# Patient Record
Sex: MALE | Race: AMERICAN INDIAN OR ALASKA NATIVE | NOT HISPANIC OR LATINO
[De-identification: names, ages, dates, MRNs, and addresses within clinical notes are randomized per-mention and may not be internally consistent; named-entity substitution may affect disease eponyms.]

---

## 2020-03-17 ENCOUNTER — TRANSCRIPTION ENCOUNTER (OUTPATIENT)
Age: 30
End: 2020-03-17

## 2020-03-19 ENCOUNTER — TRANSCRIPTION ENCOUNTER (OUTPATIENT)
Age: 30
End: 2020-03-19

## 2020-03-26 ENCOUNTER — TRANSCRIPTION ENCOUNTER (OUTPATIENT)
Age: 30
End: 2020-03-26

## 2020-04-02 ENCOUNTER — TRANSCRIPTION ENCOUNTER (OUTPATIENT)
Age: 30
End: 2020-04-02

## 2020-04-23 ENCOUNTER — TRANSCRIPTION ENCOUNTER (OUTPATIENT)
Age: 30
End: 2020-04-23

## 2021-04-16 ENCOUNTER — OUTPATIENT (OUTPATIENT)
Dept: OUTPATIENT SERVICES | Facility: HOSPITAL | Age: 31
LOS: 1 days | Discharge: STILL ADMITTED OR ADMINISTRATIVE | End: 2021-04-16
Payer: MEDICAID

## 2021-04-16 PROCEDURE — 90839 PSYTX CRISIS INITIAL 60 MIN: CPT | Mod: 95

## 2021-04-19 DIAGNOSIS — F33.2 MAJOR DEPRESSIVE DISORDER, RECURRENT SEVERE WITHOUT PSYCHOTIC FEATURES: ICD-10-CM

## 2021-07-02 ENCOUNTER — OUTPATIENT (OUTPATIENT)
Dept: OUTPATIENT SERVICES | Facility: HOSPITAL | Age: 31
LOS: 1 days | Discharge: STILL ADMITTED OR ADMINISTRATIVE | End: 2021-07-02
Payer: MEDICAID

## 2021-07-02 PROCEDURE — 99214 OFFICE O/P EST MOD 30 MIN: CPT

## 2021-07-06 DIAGNOSIS — F60.3 BORDERLINE PERSONALITY DISORDER: ICD-10-CM

## 2021-07-06 DIAGNOSIS — F41.1 GENERALIZED ANXIETY DISORDER: ICD-10-CM

## 2021-07-06 DIAGNOSIS — F33.2 MAJOR DEPRESSIVE DISORDER, RECURRENT SEVERE WITHOUT PSYCHOTIC FEATURES: ICD-10-CM

## 2021-12-08 ENCOUNTER — NON-APPOINTMENT (OUTPATIENT)
Age: 31
End: 2021-12-08

## 2021-12-09 ENCOUNTER — OUTPATIENT (OUTPATIENT)
Dept: OUTPATIENT SERVICES | Facility: HOSPITAL | Age: 31
LOS: 1 days | Discharge: HOME | End: 2021-12-09
Payer: MEDICAID

## 2021-12-09 ENCOUNTER — TRANSCRIPTION ENCOUNTER (OUTPATIENT)
Age: 31
End: 2021-12-09

## 2021-12-09 ENCOUNTER — APPOINTMENT (OUTPATIENT)
Dept: WOUND CARE | Facility: HOSPITAL | Age: 31
End: 2021-12-09
Payer: MEDICAID

## 2021-12-09 VITALS
DIASTOLIC BLOOD PRESSURE: 79 MMHG | WEIGHT: 155 LBS | HEIGHT: 67 IN | TEMPERATURE: 97 F | HEART RATE: 61 BPM | SYSTOLIC BLOOD PRESSURE: 147 MMHG | RESPIRATION RATE: 18 BRPM | BODY MASS INDEX: 24.33 KG/M2

## 2021-12-09 VITALS
HEART RATE: 61 BPM | DIASTOLIC BLOOD PRESSURE: 79 MMHG | HEIGHT: 67 IN | OXYGEN SATURATION: 97 % | RESPIRATION RATE: 18 BRPM | WEIGHT: 155 LBS | TEMPERATURE: 97 F | BODY MASS INDEX: 24.33 KG/M2 | SYSTOLIC BLOOD PRESSURE: 147 MMHG

## 2021-12-09 DIAGNOSIS — Z83.3 FAMILY HISTORY OF DIABETES MELLITUS: ICD-10-CM

## 2021-12-09 DIAGNOSIS — Z82.49 FAMILY HISTORY OF ISCHEMIC HEART DISEASE AND OTHER DISEASES OF THE CIRCULATORY SYSTEM: ICD-10-CM

## 2021-12-09 DIAGNOSIS — Z82.5 FAMILY HISTORY OF ASTHMA AND OTHER CHRONIC LOWER RESPIRATORY DISEASES: ICD-10-CM

## 2021-12-09 DIAGNOSIS — T22.332A: ICD-10-CM

## 2021-12-09 DIAGNOSIS — S41.009A UNSPECIFIED OPEN WOUND OF UNSPECIFIED SHOULDER, INITIAL ENCOUNTER: ICD-10-CM

## 2021-12-09 DIAGNOSIS — Z86.59 PERSONAL HISTORY OF OTHER MENTAL AND BEHAVIORAL DISORDERS: ICD-10-CM

## 2021-12-09 DIAGNOSIS — Z78.9 OTHER SPECIFIED HEALTH STATUS: ICD-10-CM

## 2021-12-09 PROBLEM — Z00.00 ENCOUNTER FOR PREVENTIVE HEALTH EXAMINATION: Status: ACTIVE | Noted: 2021-12-09

## 2021-12-09 PROCEDURE — 99203 OFFICE O/P NEW LOW 30 MIN: CPT

## 2021-12-09 PROCEDURE — G0463: CPT

## 2021-12-09 RX ORDER — ACETAMINOPHEN 500 MG/1
500 TABLET, COATED ORAL DAILY
Refills: 0 | Status: ACTIVE | COMMUNITY

## 2021-12-09 RX ORDER — DULOXETINE HYDROCHLORIDE 30 MG/1
30 CAPSULE, DELAYED RELEASE ORAL DAILY
Refills: 0 | Status: ACTIVE | COMMUNITY
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
## 2021-12-10 NOTE — ASSESSMENT
[Verbal] : Verbal [Demo] : Demo [Patient] : Patient [Good - alert, interested, motivated] : Good - alert, interested, motivated [Verbalizes knowledge/Understanding] : Verbalizes knowledge/understanding [Dressing changes] : dressing changes [Skin Care] : skin care [Signs and symptoms of infection] : sign and symptoms of infection [How and When to Call] : how and when to call [Patient responsibility to plan of care] : patient responsibility to plan of care [] : Yes [Stable] : stable [Home] : Home [Ambulatory] : Ambulatory [FreeTextEntry2] : Alteration in skin integrity- promote optimal skin integrity\par  [FreeTextEntry4] : Dr Walker/ Photos taken\par Pt states he is managed for his mental wellness by Dr Esqueda (Psychiatrist) & has appointment for follow up on 12/21/21- Dr Walker aware & discussed same with pt\par Medihoney escribed by Dr Walker\par F/U to [facility] in 1 week
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
## 2021-12-12 DIAGNOSIS — Z82.49 FAMILY HISTORY OF ISCHEMIC HEART DISEASE AND OTHER DISEASES OF THE CIRCULATORY SYSTEM: ICD-10-CM

## 2021-12-12 DIAGNOSIS — Z83.3 FAMILY HISTORY OF DIABETES MELLITUS: ICD-10-CM

## 2021-12-12 DIAGNOSIS — Z82.5 FAMILY HISTORY OF ASTHMA AND OTHER CHRONIC LOWER RESPIRATORY DISEASES: ICD-10-CM

## 2021-12-12 DIAGNOSIS — Z90.49 ACQUIRED ABSENCE OF OTHER SPECIFIED PARTS OF DIGESTIVE TRACT: ICD-10-CM
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
## 2021-12-12 DIAGNOSIS — T22.332A BURN OF THIRD DEGREE OF LEFT UPPER ARM, INITIAL ENCOUNTER: ICD-10-CM

## 2021-12-12 DIAGNOSIS — Z88.8 ALLERGY STATUS TO OTHER DRUGS, MEDICAMENTS AND BIOLOGICAL SUBSTANCES STATUS: ICD-10-CM

## 2021-12-12 DIAGNOSIS — Z79.899 OTHER LONG TERM (CURRENT) DRUG THERAPY: ICD-10-CM
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
## 2021-12-12 DIAGNOSIS — F41.8 OTHER SPECIFIED ANXIETY DISORDERS: ICD-10-CM

## 2021-12-12 DIAGNOSIS — T31.0 BURNS INVOLVING LESS THAN 10% OF BODY SURFACE: ICD-10-CM
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
## 2021-12-16 ENCOUNTER — APPOINTMENT (OUTPATIENT)
Dept: WOUND CARE | Facility: HOSPITAL | Age: 31
End: 2021-12-16

## 2021-12-17 ENCOUNTER — APPOINTMENT (OUTPATIENT)
Dept: WOUND CARE | Facility: HOSPITAL | Age: 31
End: 2021-12-17

## 2021-12-29 ENCOUNTER — APPOINTMENT (OUTPATIENT)
Dept: WOUND CARE | Facility: HOSPITAL | Age: 31
End: 2021-12-29

## 2022-01-19 ENCOUNTER — APPOINTMENT (OUTPATIENT)
Dept: WOUND CARE | Facility: HOSPITAL | Age: 32
End: 2022-01-19

## 2022-05-10 ENCOUNTER — NON-APPOINTMENT (OUTPATIENT)
Age: 32
End: 2022-05-10

## 2022-05-11 ENCOUNTER — OUTPATIENT (OUTPATIENT)
Dept: OUTPATIENT SERVICES | Facility: HOSPITAL | Age: 32
LOS: 1 days | Discharge: ANOTHER HOSPITAL | End: 2022-05-11
Payer: MEDICAID

## 2022-05-11 ENCOUNTER — APPOINTMENT (OUTPATIENT)
Dept: WOUND CARE | Facility: HOSPITAL | Age: 32
End: 2022-05-11
Payer: MEDICAID

## 2022-05-11 ENCOUNTER — EMERGENCY (EMERGENCY)
Facility: HOSPITAL | Age: 32
LOS: 1 days | Discharge: HOME | End: 2022-05-11
Attending: EMERGENCY MEDICINE | Admitting: EMERGENCY MEDICINE
Payer: MEDICAID

## 2022-05-11 VITALS
DIASTOLIC BLOOD PRESSURE: 100 MMHG | RESPIRATION RATE: 20 BRPM | HEIGHT: 67 IN | SYSTOLIC BLOOD PRESSURE: 139 MMHG | WEIGHT: 155 LBS | BODY MASS INDEX: 24.33 KG/M2 | TEMPERATURE: 97.7 F | HEART RATE: 110 BPM | OXYGEN SATURATION: 97 %

## 2022-05-11 VITALS
HEART RATE: 95 BPM | TEMPERATURE: 98 F | SYSTOLIC BLOOD PRESSURE: 128 MMHG | OXYGEN SATURATION: 99 % | RESPIRATION RATE: 18 BRPM | DIASTOLIC BLOOD PRESSURE: 81 MMHG

## 2022-05-11 VITALS
WEIGHT: 179.9 LBS | TEMPERATURE: 98 F | HEART RATE: 100 BPM | DIASTOLIC BLOOD PRESSURE: 94 MMHG | RESPIRATION RATE: 16 BRPM | HEIGHT: 67 IN | SYSTOLIC BLOOD PRESSURE: 135 MMHG | OXYGEN SATURATION: 97 %

## 2022-05-11 DIAGNOSIS — F60.3 BORDERLINE PERSONALITY DISORDER: ICD-10-CM

## 2022-05-11 DIAGNOSIS — T31.0 BURNS INVOLVING LESS THAN 10% OF BODY SURFACE: ICD-10-CM

## 2022-05-11 DIAGNOSIS — F43.29 ADJUSTMENT DISORDER WITH OTHER SYMPTOMS: ICD-10-CM

## 2022-05-11 DIAGNOSIS — T22.232A BURN OF SECOND DEGREE OF LEFT UPPER ARM, INITIAL ENCOUNTER: ICD-10-CM

## 2022-05-11 DIAGNOSIS — S51.009A UNSPECIFIED OPEN WOUND OF UNSPECIFIED ELBOW, INITIAL ENCOUNTER: ICD-10-CM

## 2022-05-11 LAB
ALBUMIN SERPL ELPH-MCNC: 4.1 G/DL — SIGNIFICANT CHANGE UP (ref 3.3–5)
ALP SERPL-CCNC: 63 U/L — SIGNIFICANT CHANGE UP (ref 40–120)
ALT FLD-CCNC: 26 U/L — SIGNIFICANT CHANGE UP (ref 12–78)
ANION GAP SERPL CALC-SCNC: 6 MMOL/L — SIGNIFICANT CHANGE UP (ref 5–17)
APAP SERPL-MCNC: <2 UG/ML — LOW (ref 10–30)
APPEARANCE UR: CLEAR — SIGNIFICANT CHANGE UP
AST SERPL-CCNC: 24 U/L — SIGNIFICANT CHANGE UP (ref 15–37)
BASOPHILS # BLD AUTO: 0.04 K/UL — SIGNIFICANT CHANGE UP (ref 0–0.2)
BASOPHILS NFR BLD AUTO: 0.8 % — SIGNIFICANT CHANGE UP (ref 0–2)
BILIRUB SERPL-MCNC: 0.5 MG/DL — SIGNIFICANT CHANGE UP (ref 0.2–1.2)
BILIRUB UR-MCNC: NEGATIVE — SIGNIFICANT CHANGE UP
BUN SERPL-MCNC: 11 MG/DL — SIGNIFICANT CHANGE UP (ref 7–23)
CALCIUM SERPL-MCNC: 9.7 MG/DL — SIGNIFICANT CHANGE UP (ref 8.5–10.1)
CHLORIDE SERPL-SCNC: 103 MMOL/L — SIGNIFICANT CHANGE UP (ref 96–108)
CO2 SERPL-SCNC: 29 MMOL/L — SIGNIFICANT CHANGE UP (ref 22–31)
COLOR SPEC: YELLOW — SIGNIFICANT CHANGE UP
CREAT SERPL-MCNC: 1.1 MG/DL — SIGNIFICANT CHANGE UP (ref 0.5–1.3)
DIFF PNL FLD: NEGATIVE — SIGNIFICANT CHANGE UP
EGFR: 93 ML/MIN/1.73M2 — SIGNIFICANT CHANGE UP
EOSINOPHIL # BLD AUTO: 0.08 K/UL — SIGNIFICANT CHANGE UP (ref 0–0.5)
EOSINOPHIL NFR BLD AUTO: 1.6 % — SIGNIFICANT CHANGE UP (ref 0–6)
GLUCOSE SERPL-MCNC: 87 MG/DL — SIGNIFICANT CHANGE UP (ref 70–99)
GLUCOSE UR QL: NEGATIVE — SIGNIFICANT CHANGE UP
HCT VFR BLD CALC: 42.2 % — SIGNIFICANT CHANGE UP (ref 39–50)
HGB BLD-MCNC: 13.8 G/DL — SIGNIFICANT CHANGE UP (ref 13–17)
IMM GRANULOCYTES NFR BLD AUTO: 0.4 % — SIGNIFICANT CHANGE UP (ref 0–1.5)
KETONES UR-MCNC: ABNORMAL
LEUKOCYTE ESTERASE UR-ACNC: ABNORMAL
LYMPHOCYTES # BLD AUTO: 1.23 K/UL — SIGNIFICANT CHANGE UP (ref 1–3.3)
LYMPHOCYTES # BLD AUTO: 24.7 % — SIGNIFICANT CHANGE UP (ref 13–44)
MCHC RBC-ENTMCNC: 28.1 PG — SIGNIFICANT CHANGE UP (ref 27–34)
MCHC RBC-ENTMCNC: 32.7 GM/DL — SIGNIFICANT CHANGE UP (ref 32–36)
MCV RBC AUTO: 85.9 FL — SIGNIFICANT CHANGE UP (ref 80–100)
MONOCYTES # BLD AUTO: 0.56 K/UL — SIGNIFICANT CHANGE UP (ref 0–0.9)
MONOCYTES NFR BLD AUTO: 11.2 % — SIGNIFICANT CHANGE UP (ref 2–14)
NEUTROPHILS # BLD AUTO: 3.05 K/UL — SIGNIFICANT CHANGE UP (ref 1.8–7.4)
NEUTROPHILS NFR BLD AUTO: 61.3 % — SIGNIFICANT CHANGE UP (ref 43–77)
NITRITE UR-MCNC: NEGATIVE — SIGNIFICANT CHANGE UP
NRBC # BLD: 0 /100 WBCS — SIGNIFICANT CHANGE UP (ref 0–0)
PCP SPEC-MCNC: SIGNIFICANT CHANGE UP
PH UR: 5 — SIGNIFICANT CHANGE UP (ref 5–8)
PLATELET # BLD AUTO: 300 K/UL — SIGNIFICANT CHANGE UP (ref 150–400)
POTASSIUM SERPL-MCNC: 3.3 MMOL/L — LOW (ref 3.5–5.3)
POTASSIUM SERPL-SCNC: 3.3 MMOL/L — LOW (ref 3.5–5.3)
PROT SERPL-MCNC: 7.7 G/DL — SIGNIFICANT CHANGE UP (ref 6–8.3)
PROT UR-MCNC: 15
RBC # BLD: 4.91 M/UL — SIGNIFICANT CHANGE UP (ref 4.2–5.8)
RBC # FLD: 12.3 % — SIGNIFICANT CHANGE UP (ref 10.3–14.5)
SALICYLATES SERPL-MCNC: 2.3 MG/DL — LOW (ref 2.8–20)
SARS-COV-2 RNA SPEC QL NAA+PROBE: SIGNIFICANT CHANGE UP
SODIUM SERPL-SCNC: 138 MMOL/L — SIGNIFICANT CHANGE UP (ref 135–145)
SP GR SPEC: 1.02 — SIGNIFICANT CHANGE UP (ref 1.01–1.02)
UROBILINOGEN FLD QL: NEGATIVE — SIGNIFICANT CHANGE UP
WBC # BLD: 4.98 K/UL — SIGNIFICANT CHANGE UP (ref 3.8–10.5)
WBC # FLD AUTO: 4.98 K/UL — SIGNIFICANT CHANGE UP (ref 3.8–10.5)

## 2022-05-11 PROCEDURE — 36415 COLL VENOUS BLD VENIPUNCTURE: CPT

## 2022-05-11 PROCEDURE — 85025 COMPLETE CBC W/AUTO DIFF WBC: CPT

## 2022-05-11 PROCEDURE — 81001 URINALYSIS AUTO W/SCOPE: CPT

## 2022-05-11 PROCEDURE — 80053 COMPREHEN METABOLIC PANEL: CPT

## 2022-05-11 PROCEDURE — 87635 SARS-COV-2 COVID-19 AMP PRB: CPT

## 2022-05-11 PROCEDURE — 99285 EMERGENCY DEPT VISIT HI MDM: CPT

## 2022-05-11 PROCEDURE — 90792 PSYCH DIAG EVAL W/MED SRVCS: CPT | Mod: 95

## 2022-05-11 PROCEDURE — 80307 DRUG TEST PRSMV CHEM ANLYZR: CPT

## 2022-05-11 PROCEDURE — G0463: CPT

## 2022-05-11 PROCEDURE — 99213 OFFICE O/P EST LOW 20 MIN: CPT

## 2022-05-11 RX ORDER — DEXTROAMPHETAMINE SULFATE, DEXTROAMPHETAMINE SACCHARATE, AMPHETAMINE SULFATE AND AMPHETAMINE ASPARTATE 1.25; 1.25; 1.25; 1.25 MG/1; MG/1; MG/1; MG/1
5 CAPSULE, EXTENDED RELEASE ORAL DAILY
Refills: 0 | Status: ACTIVE | COMMUNITY

## 2022-05-11 RX ORDER — ARIPIPRAZOLE 15 MG/1
15 TABLET ORAL DAILY
Refills: 0 | Status: DISCONTINUED | COMMUNITY
End: 2022-05-11

## 2022-05-11 NOTE — ED BEHAVIORAL HEALTH NOTE - BEHAVIORAL HEALTH NOTE
==================  PRE-HOSPITAL COURSE  ===================  SOURCE:  RN ____ and secondhand ED documentation.  DETAILS:  Per chart, patient     ============  ED COURSE  =============  SOURCE:  RN ____ and secondhand ED documentation.  ARRIVAL:  Per chart and RN, patient arrived ______. Per RN, patient was _______ upon arrival, and ______ with triage process.  BELONGINGS:  Per RN, patient arrived with ______. All belongings were provided to hospital security, and patient currently in a gown with a 1:1 staff member.  BEHAVIOR: RN described patient to be _______, presenting with ________ thought process, (AAOx3?), presenting with ____ mood and ____ affect, remains in ______ behavioral and impulse control, is ____ violent/aggressive. RN stated that the patient is (SI/HI/A/VH?). RN stated that there ______ visible marks, bruises, or lacerations on the body. RN stated that the patient appears to be _____-groomed, maintains ____ hygiene, and reports _____ ADLs, ambulates __________ (without assistance?)  TREATMENT:  Per chart and RN, patient (did/did not) PRN medications.   VISITORS:  Per RN,     ========================  FOR EACH COLLATERAL   ========================  NAME:     NUMBER:  RELATIONSHIP:  RELIABILITY:  COMMENTS:           Collateral has requested that the information provided remain confidential: Yes [  ] No [  ]           Collateral has provided information that patient is/may be unaware of: Yes [  ] No [  ]        ========================  PATIENT DEMOGRAPHICS:  ========================  HPI  BASELINE FUNCTIONING:  DATE HPI STARTED:  DECOMPENSATION:  SUICIDALITY  VIOLENCE:    SUBSTANCE:          ========================  PAST PSYCHIATRIC HISTORY  ========================  DATE PAST PSYCHIATRIC HISTORY STARTED:  MAIN PSYCHIATRIC DIAGNOSIS:  PSYCHIATRIC HOSPITALIZATIONS:    PRIOR ILLNESS:  SUICIDALITY:    VIOLENCE:    SUBSTANCE USE:       ==============  OTHER HISTORY  ==============  CURRENT MEDICATION:    MEDICAL HISTORY:    ALLERGIES:     LEGAL ISSUES:  FIREARM ACCESS:  SOCIAL HISTORY:     COVID Exposure Screen- collateral (i.e. third-party, chart review, belongings, etc; include EMS and ED staff)  1.        *Has the patient had a COVID-19 test in the last 90 days?  (  ) Yes   (  ) No   (  ) Unknown- Reason: _____  IF YES PROCEED TO QUESTION #2. IF NO OR UNKNOWN, PLEASE SKIP TO QUESTION #3.  2.        Date of test(s) and result(s): ________  3.        *Has the patient tested positive for COVID-19 antibodies? (  ) Yes   (  ) No   (  ) Unknown- Reason: _____  IF YES PRO  CEED TO QUESTION #4. IF NO or UNKNOWN, PLEASE SKIP TO QUESTION #5.  4.        Date of positive antibody test: ________  5.        *Has the patient received 2 doses of the COVID-19 vaccine? (  ) Yes   (  ) No   (  ) Unknown- Reason: _____  IF YES PROCEED TO QUESTION #6. IF NO or UNKNOWN, PLEASE SKIP TO QUESTION #7.  6.         Date of second dose: ________  7.        *In the past 10 days, has the patient been around anyone with a positive COVID-19 test?* (  ) Yes   (  ) No   (  ) Unknown- Reason: __  IF YES PROCEED TO QUESTION #8. IF NO or UNKNOWN, PLEASE SKIP TO QUESTION #13.  8.        Was the patient within 6 feet of them for at least 15 minutes? (  ) Yes   (  ) No   (  ) Unknown- Reason: ____  9.        Did the patient provide care for them? (  ) Yes   (  ) No   (  ) Unknown- Reason: ______  10.     Did the patient have direct physical contact with them (touched, hugged, or kissed them)? (  ) Yes   (  ) No	(  ) Unknown- Reason: __  11.     Did the patient share eating or drinking utensils with them? (  ) Yes   (  ) No	(  ) Unknown- Reason: ____  12.     Did they sneeze, cough, or somehow get respiratory droplets on the patient? (  ) Yes   (  ) No	(  ) Unknown- Reason: ______  13.     *Has the patient been out of [place] within the past 10 days?* (  ) Yes   (  ) No   (  ) Unknown- Reason: _____  IF YES PLEASE ANSWER THE FOLLOWING QUESTIONS:  14.     Which state/country did they go to? ______  15.     Were they there over 24 hours? (  ) Yes   (  ) No	(  ) Unknown- Reason: ______ ==================  PRE-HOSPITAL COURSE  ===================  SOURCE:  RN and secondhand ED documentation.  DETAILS:  Per chart, patient was BIB self from wound-care center due to staff's concerns of SI after being seen for open wounds self-inflicted with hot knife.     ============  ED COURSE  =============  SOURCE:  RN and secondhand ED documentation.  ARRIVAL:  Per chart and RN, patient arrived as a walk-in, no triage issues.   BELONGINGS:  Per chart, pt arrived with clothing and a cell phone. All belongings were provided to hospital security, and patient currently in a gown with a 1:1 staff member.  BEHAVIOR: RN described patient to be currently calm and cooperative, presenting with linear thought process and thought content WNL, is AAOx3, presenting with stable mood and appropriate affect, remains in good behavioral and impulse control, is not currently violent/aggressive. RN stated that the patient is denying current SI/HI/A/VH. RN stated that there multiple recent lacerations and burn marks noted on pts left arm that pt admits to self-inflicting with a hot knife. RN stated that the patient appears to be fairly-groomed.  TREATMENT:  Per chart and RN, patient did not require PRN medications.   VISITORS:  None     ========================  FOR EACH COLLATERAL   ========================  NAME: Melissa Loaiza     NUMBER: [phone] ext. [phone]  RELATIONSHIP:  from [employer] - working with pt since 3/23/22  RELIABILITY: High  Collateral has requested that the information provided remain confidential: Yes [  ] No [ x ]  Collateral has provided information that patient is/may be unaware of: Yes [  ] No [ x ]    Patient is a 31M currently shelter domiciled, single, without children, non-caregiver, employed as grocery , PPHx/o ADHD, Schizoaffective Bipolar type, OCD, Borderline Personality Disorder, Opioid d/o, Cannabis use d/o, and amphetamine use d/o, currently in tx with [facility] program, reported hx/o IPP admissions, no known PMHx, no legal hx, no known access to firearms. Melissa stated that she was not present at time of decompensation, though was made aware that the pt was at his regular wound care appt at [facility] today and pt screened positive for SI on suicide screen, which led staff at the wound care facility to recommend pt for psych eval at ER for SI and self-harm. Per Melissa, pt chronically experiences SI and self-harms by heating up a knife and burning himself (frequency unclear). Melissa denies knowledge of past SA/self-injury, states this is pt's typical form of self-harm.     Melissa states that pt was recently in the ER twice this past week after losing his housing last Monday 5/9/22, states pt was renting a room with other individuals, and reportedly was kicked out by his roommates. Pt first went to [facility] 5/9/22 then was cleared, then again the same day at [facility] ER, and was cleared again. Melissa states that the pt reports feeling more depressed and self-harming more. Melissa added that the pt currently lacks social support, as he has an estranged relationship with his family members including his brother. Melissa states pt has an intake appt with a psychiatrist for the [facility] program tmr 5/12/22, is currently in tx with Dr. Esqueda [phone] and therapist Reva (same number), unable to provide med list though states pt is currently medically compliant. Melissa denies current safety concerns in the event of psychiatric clearance, though states she would be in support of admission due to recent social stressors exacerbating pt's psychiatric symptoms.      COVID Exposure Screen- collateral (i.e. third-party, chart review, belongings, etc; include EMS and ED staff)  1.        *Has the patient had a COVID-19 test in the last 90 days?  (  ) Yes   (  ) No   ( x ) Unknown- Reason: Unable to assess.  IF YES PROCEED TO QUESTION #2. IF NO OR UNKNOWN, PLEASE SKIP TO QUESTION #3.  2.        Date of test(s) and result(s): ________  3.        *Has the patient tested positive for COVID-19 antibodies? (  ) Yes   (  ) No   ( x ) Unknown- Reason: Unable to assess.  IF YES PRO  CEED TO QUESTION #4. IF NO or UNKNOWN, PLEASE SKIP TO QUESTION #5.  4.        Date of positive antibody test: ________  5.        *Has the patient received 2 doses of the COVID-19 vaccine? (  ) Yes   (  ) No   (  ) Unknown- Reason: _____  IF YES PROCEED TO QUESTION #6. IF NO or UNKNOWN, PLEASE SKIP TO QUESTION #7.  6.         Date of second dose: ________  7.        *In the past 10 days, has the patient been around anyone with a positive COVID-19 test?* (  ) Yes   (  ) No   ( x ) Unknown- Reason: Unable to assess.  IF YES PROCEED TO QUESTION #8. IF NO or UNKNOWN, PLEASE SKIP TO QUESTION #13.  8.        Was the patient within 6 feet of them for at least 15 minutes? (  ) Yes   (  ) No   (  ) Unknown- Reason: ____  9.        Did the patient provide care for them? (  ) Yes   (  ) No   (  ) Unknown- Reason: ______  10.     Did the patient have direct physical contact with them (touched, hugged, or kissed them)? (  ) Yes   (  ) No	(  ) Unknown- Reason: __  11.     Did the patient share eating or drinking utensils with them? (  ) Yes   (  ) No	(  ) Unknown- Reason: ____  12.     Did they sneeze, cough, or somehow get respiratory droplets on the patient? (  ) Yes   (  ) No	(  ) Unknown- Reason: ______  13.     *Has the patient been out of [place] within the past 10 days?* (  ) Yes   (  ) No   ( x ) Unknown- Reason: Unable to assess.  IF YES PLEASE ANSWER THE FOLLOWING QUESTIONS:  14.     Which state/country did they go to? ______  15.     Were they there over 24 hours? (  ) Yes   (  ) No	(  ) Unknown- Reason: ______ ==================  PRE-HOSPITAL COURSE  ===================  SOURCE:  RN and secondhand ED documentation.  DETAILS:  Per chart, patient was BIB self from wound-care center due to staff's concerns of SI after being seen for open wounds self-inflicted with hot knife.     ============  ED COURSE  =============  SOURCE:  RN and secondhand ED documentation.  ARRIVAL:  Per chart and RN, patient arrived as a walk-in, no triage issues.   BELONGINGS:  Per chart, pt arrived with clothing and a cell phone. All belongings were provided to hospital security, and patient currently in a gown with a 1:1 staff member.  BEHAVIOR: RN described patient to be currently calm and cooperative, presenting with linear thought process and thought content WNL, is AAOx3, presenting with stable mood and appropriate affect, remains in good behavioral and impulse control, is not currently violent/aggressive. RN stated that the patient is denying current SI/HI/A/VH. RN stated that there multiple recent lacerations and burn marks noted on pts left arm that pt admits to self-inflicting with a hot knife. RN stated that the patient appears to be fairly-groomed.  TREATMENT:  Per chart and RN, patient did not require PRN medications.   VISITORS:  None     ========================  FOR EACH COLLATERAL   ========================  NAME: Melissa Loaiza     NUMBER: [phone] ext. [phone]  RELATIONSHIP:  from [employer] - working with pt since 3/23/22  RELIABILITY: High  Collateral has requested that the information provided remain confidential: Yes [  ] No [ x ]  Collateral has provided information that patient is/may be unaware of: Yes [  ] No [ x ]    Patient is a 31M currently shelter domiciled, single, without children, non-caregiver, employed as grocery , PPHx/o ADHD, Schizoaffective Bipolar type, OCD, Borderline Personality Disorder, Opioid d/o, Cannabis use d/o, and amphetamine use d/o, currently in tx with [facility] program, reported hx/o IPP admissions, no known PMHx, no legal hx, no known access to firearms. Melissa stated that she was not present at time of decompensation, though was made aware that the pt was at his regular wound care appt at [facility] today and pt screened positive for SI on suicide screen, which led staff at the wound care facility to recommend pt for psych eval at ER for SI and self-harm. Per Melissa, pt chronically experiences SI and self-harms by heating up a knife and burning himself (frequency unclear). Melissa denies knowledge of past SA other self-injury, states this is pt's typical form of self-harm.     Melissa states that pt was recently in the ER twice this past week after losing his housing last Monday 5/9/22, states pt was renting a room with other individuals, and reportedly was kicked out by his roommates. Pt first went to [facility] 5/9/22 then was cleared, then again the same day at [facility] ER, and was cleared again. Melissa states that the pt reports feeling more depressed and self-harming more. Melissa added that the pt currently lacks social support, as he has an estranged relationship with his family members including his brother. Melissa states pt has an intake appt with a psychiatrist for the [facility] program tmr 5/12/22, is currently in tx with Dr. Esqueda [phone] and therapist Reva (same number), unable to provide med list though states pt is currently medically compliant. Melissa denies current safety concerns in the event of psychiatric clearance, though states she would be in support of admission due to recent social stressors exacerbating pt's psychiatric symptoms.      COVID Exposure Screen- collateral (i.e. third-party, chart review, belongings, etc; include EMS and ED staff)  1.        *Has the patient had a COVID-19 test in the last 90 days?  (  ) Yes   (  ) No   ( x ) Unknown- Reason: Unable to assess.  IF YES PROCEED TO QUESTION #2. IF NO OR UNKNOWN, PLEASE SKIP TO QUESTION #3.  2.        Date of test(s) and result(s): ________  3.        *Has the patient tested positive for COVID-19 antibodies? (  ) Yes   (  ) No   ( x ) Unknown- Reason: Unable to assess.  IF YES PRO  CEED TO QUESTION #4. IF NO or UNKNOWN, PLEASE SKIP TO QUESTION #5.  4.        Date of positive antibody test: ________  5.        *Has the patient received 2 doses of the COVID-19 vaccine? (  ) Yes   (  ) No   (  ) Unknown- Reason: _____  IF YES PROCEED TO QUESTION #6. IF NO or UNKNOWN, PLEASE SKIP TO QUESTION #7.  6.         Date of second dose: ________  7.        *In the past 10 days, has the patient been around anyone with a positive COVID-19 test?* (  ) Yes   (  ) No   ( x ) Unknown- Reason: Unable to assess.  IF YES PROCEED TO QUESTION #8. IF NO or UNKNOWN, PLEASE SKIP TO QUESTION #13.  8.        Was the patient within 6 feet of them for at least 15 minutes? (  ) Yes   (  ) No   (  ) Unknown- Reason: ____  9.        Did the patient provide care for them? (  ) Yes   (  ) No   (  ) Unknown- Reason: ______  10.     Did the patient have direct physical contact with them (touched, hugged, or kissed them)? (  ) Yes   (  ) No	(  ) Unknown- Reason: __  11.     Did the patient share eating or drinking utensils with them? (  ) Yes   (  ) No	(  ) Unknown- Reason: ____  12.     Did they sneeze, cough, or somehow get respiratory droplets on the patient? (  ) Yes   (  ) No	(  ) Unknown- Reason: ______  13.     *Has the patient been out of [place] within the past 10 days?* (  ) Yes   (  ) No   ( x ) Unknown- Reason: Unable to assess.  IF YES PLEASE ANSWER THE FOLLOWING QUESTIONS:  14.     Which state/country did they go to? ______  15.     Were they there over 24 hours? (  ) Yes   (  ) No	(  ) Unknown- Reason: ______

## 2022-05-11 NOTE — ED BEHAVIORAL HEALTH ASSESSMENT NOTE - OTHER
wound care clinic appropriately future oriented, self preserving, help seeking and insightful not observed Records checked– with data: Tiffany. Records checked- no data: [facility], [facility], [facility], [facility], [facility] ED Visits, [facility] Outpatient BH, [facility] Outpatient Medical, [facility], [facility], [facility], [facility], [facility], [facility],  [facility], [facility], [facility], [facility], [facility], [facility], [facility] , [facility], [facility], [employer] [facility].

## 2022-05-11 NOTE — ED PROVIDER NOTE - OBJECTIVE STATEMENT
Pt is a 30 yo male with pmhx of substance abuse and suicidal ideation sent in from wound clinic for SI. Pt was seen at [facility] yesterday and discharged. Pt was sent in for self inflicted open wounds with hot knife. Pt recently became homeless and lost his shelter. Pt states he had SI but denies how and does not want to be admitted would rather go to work and keep his appt.

## 2022-05-11 NOTE — ED PROVIDER NOTE - SKIN, MLM
Skin normal color for race, warm, dry and intact. old healed scars on left forearm and 3 open wounds on left forearm

## 2022-05-11 NOTE — ED PROVIDER NOTE - PATIENT PORTAL LINK FT
You can access the FollowMyHealth Patient Portal offered by [facility] by registering at the following website: [email or web address]. By joining [facility]’s FollowMyHealth portal, you will also be able to view your health information using other applications (apps) compatible with our system.
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
## 2022-05-11 NOTE — ED BEHAVIORAL HEALTH ASSESSMENT NOTE - RISK ASSESSMENT
Prominent risk and protective factors are noted in documentation above notably with ample non-modifiable risk factors with future oriented thinking and insight into need to manage or address the modifiable risk factors. Low Acute Suicide Risk

## 2022-05-11 NOTE — ED PROVIDER NOTE - NS ED MD DISPO DISCHARGE
[address] [facility] COMPLAINT       Chief Complaint   Patient presents with    Abscess     face       Nurses Notes reviewed and I agree except as noted inthe HPI. HISTORY OF PRESENT ILLNESS    Beny Caballero is a 29 y.o. male who presents to the Emergency Department for the evaluation of right facial swelling and redness with onset of 7 to 10 days ago. The patient has a history of recurrent facial abscesses. He was evaluated at a walk-in clinic on 03/24 and was prescribed Keflex. He did not notice any improvement prompting him to report for re-evaluation yesterday. He was advised to report to the ED for further evaluation/treatment. Patient denies fever, chills, nausea, or vomiting. He denies chest pain or SOB. He denies difficulty swallowing or throat tightness. Patient reports decreased appetite and poor oral intake. He does not appear to be in any distress. There are no other complaints, symptoms, or concerns at this time. The HPI was provided by the patient. REVIEW OF SYSTEMS     Review of Systems   Constitutional: Negative for chills, diaphoresis and fever. HENT: Positive for facial swelling (hx of recurrent facial abscess). Negative for sore throat, trouble swallowing and voice change. Eyes: Negative for visual disturbance. Respiratory: Negative for cough, chest tightness and shortness of breath. Cardiovascular: Negative for chest pain and leg swelling. Gastrointestinal: Negative for abdominal pain, blood in stool, diarrhea, nausea and vomiting. Endocrine: Negative for polyuria. Genitourinary: Negative for dysuria, frequency and hematuria. Musculoskeletal: Negative for back pain and neck pain. Skin: Negative for rash and wound. Allergic/Immunologic: Negative for immunocompromised state. Neurological: Negative for speech difficulty, weakness, numbness and headaches. Psychiatric/Behavioral: Negative for confusion.
Home

## 2022-05-11 NOTE — BH SAFETY PLAN - ENVIRONMENT SAFETY 1:
Eliminate all potentially dangerous toxins, sharps, burning tools and such from my immediate access or line of sight.

## 2022-05-11 NOTE — ED PROVIDER NOTE - CLINICAL SUMMARY MEDICAL DECISION MAKING FREE TEXT BOX
Pt is a 30 yo male with SI will get labs and tele psych  Melissa Loaiza  [phone] ext [phone] Syed: Pt is a 32 yo male with SI will get labs and tele psych, signed out to Dr. Tse for re eval and final dispo  Melissa Loaiza  [phone] ext [phone]

## 2022-05-11 NOTE — ED ADULT TRIAGE NOTE - CHIEF COMPLAINT QUOTE
Patient is a 32yo male complaining of suicidal thoughts. Patient was in the wound care center when he told the nurse that he had suicidal ideation.
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
## 2022-05-11 NOTE — BH SAFETY PLAN - WARNING SIGN 2
PROVIDED A PT WITH SOME CRACKERS AND APPLE JUICE PER REQUEST, DENIES PAIN OR
DISCOMFORT AT THIS TIME; WATCHING TV, EATING AT THIS TIME, WILL CONTINUE TO
MONITOR. I engage in erratic behavior

## 2022-05-11 NOTE — ED BEHAVIORAL HEALTH ASSESSMENT NOTE - NSSUICPROTFACT_PSY_ALL_CORE
Identifies reasons for living/Fear of death or the actual act of killing self/Cultural, spiritual and/or moral attitudes against suicide/Engaged in work or school/Positive therapeutic relationships/Frustration tolerance

## 2022-05-11 NOTE — ED BEHAVIORAL HEALTH ASSESSMENT NOTE - VIOLENCE PROTECTIVE FACTORS:
Employment stability/Sobriety/Engagement in treatment/Affective Stability/Insight into violence risk and need for management/treatment/Good treatment response/compliance

## 2022-05-11 NOTE — ED BEHAVIORAL HEALTH ASSESSMENT NOTE - NSACTIVEVENT_PSY_ALL_CORE
Triggering events leading to humiliation, shame, and/or despair (e.g., Loss of relationship, financial or health status) (real or anticipated)/Pending incarceration or homelessness

## 2022-05-11 NOTE — ED PROVIDER NOTE - NS ED ATTENDING STATEMENT MOD
This was a shared visit with the POLA. I reviewed and verified the documentation and independently performed the documented:

## 2022-05-11 NOTE — ED BEHAVIORAL HEALTH ASSESSMENT NOTE - PAST PSYCHOTROPIC MEDICATION
Vyvanse, Propranolol, Cymbalta, Lexapro Vyvanse, Propranolol, Cymbalta, Lexapro, Depakote, Hydroxyzine, Abilify, Strattera, Guanfacine, Luvox, Seroquel, Remeron, Buspar, Gabapentin, Lamictal
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
## 2022-05-11 NOTE — ED BEHAVIORAL HEALTH ASSESSMENT NOTE - NSHISTORFACTOR_PSY_ALL_CORE
Past SAs/SIB/Family History of psychiatric diagnoses requiring hospitalization/History of Impulsivity

## 2022-05-16 DIAGNOSIS — Z83.3 FAMILY HISTORY OF DIABETES MELLITUS: ICD-10-CM
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
## 2022-05-16 DIAGNOSIS — Z79.899 OTHER LONG TERM (CURRENT) DRUG THERAPY: ICD-10-CM
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
## 2022-05-16 DIAGNOSIS — T22.232A BURN OF SECOND DEGREE OF LEFT UPPER ARM, INITIAL ENCOUNTER: ICD-10-CM

## 2022-05-16 DIAGNOSIS — R45.851 SUICIDAL IDEATIONS: ICD-10-CM

## 2022-05-16 DIAGNOSIS — T31.0 BURNS INVOLVING LESS THAN 10% OF BODY SURFACE: ICD-10-CM

## 2022-05-16 DIAGNOSIS — Z88.8 ALLERGY STATUS TO OTHER DRUGS, MEDICAMENTS AND BIOLOGICAL SUBSTANCES STATUS: ICD-10-CM

## 2022-05-16 DIAGNOSIS — Z90.49 ACQUIRED ABSENCE OF OTHER SPECIFIED PARTS OF DIGESTIVE TRACT: ICD-10-CM

## 2022-05-16 DIAGNOSIS — Z82.5 FAMILY HISTORY OF ASTHMA AND OTHER CHRONIC LOWER RESPIRATORY DISEASES: ICD-10-CM
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
## 2022-05-16 DIAGNOSIS — F41.8 OTHER SPECIFIED ANXIETY DISORDERS: ICD-10-CM
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
## 2022-05-16 DIAGNOSIS — Z82.49 FAMILY HISTORY OF ISCHEMIC HEART DISEASE AND OTHER DISEASES OF THE CIRCULATORY SYSTEM: ICD-10-CM
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
## 2022-05-16 NOTE — PLAN
[FreeTextEntry1] : Patient seen and examined.\par wounds cleansed with NS, covered with xeroform gauze and dry dressing\par recommend dressing change 3 x per week\par Patient brought to ER for Psych evaluation due to suicidal ideation within last 24 hours\par \par Call with any concerns\par Follow up in 2 weeks\par \par 30 minutes counseling and coordinating care

## 2022-05-16 NOTE — PHYSICAL EXAM
[4 x 4] : 4 x 4  [Skin Ulcer] : ulcer [Alert] : alert [Oriented to Person] : oriented to person [Oriented to Place] : oriented to place [Oriented to Time] : oriented to time [de-identified] : WD, WN in no distress [FreeTextEntry1] : Left posterior upper arm [FreeTextEntry2] : 1.4 [FreeTextEntry3] : 2.4 [FreeTextEntry4] : 0.1 [de-identified] : small serosanguineous [de-identified] : intact [de-identified] : none [de-identified] : 100% [de-identified] : Xeroform [de-identified] : Mechanically cleansed with normal saline solution and sterile gauze,\par  [FreeTextEntry7] : Left posterior forearm [FreeTextEntry8] : 3.0 [FreeTextEntry9] : 4.5 [de-identified] : 0.1 [de-identified] : small serosanguineous [de-identified] : intact [de-identified] : none [de-identified] : 80% [de-identified] : 20% [de-identified] : Xeroform [de-identified] : Mechanically cleansed with normal saline solution and sterile gauze,\par  [de-identified] : Left anterior wrist [de-identified] : 0.9 [de-identified] : 2.8 [de-identified] : 0.1 [de-identified] : small serosanguineous [de-identified] : intact [de-identified] : none [de-identified] : 80% [de-identified] : 20% [de-identified] : Xerform [de-identified] : Mechanically cleansed with normal saline solution and sterile gauze,\par  [de-identified] : 3x Weekly [de-identified] : 3x Weekly [de-identified] : 3x Weekly

## 2022-06-14 ENCOUNTER — OFFICE (OUTPATIENT)
Dept: URBAN - METROPOLITAN AREA CLINIC 63 | Facility: CLINIC | Age: 32
Setting detail: OPHTHALMOLOGY
End: 2022-06-14
Payer: COMMERCIAL

## 2022-06-14 DIAGNOSIS — H31.002: ICD-10-CM

## 2022-06-14 DIAGNOSIS — H16.223: ICD-10-CM

## 2022-06-14 DIAGNOSIS — G43.009: ICD-10-CM

## 2022-06-14 DIAGNOSIS — H52.13: ICD-10-CM

## 2022-06-14 PROCEDURE — 92015 DETERMINE REFRACTIVE STATE: CPT | Performed by: STUDENT IN AN ORGANIZED HEALTH CARE EDUCATION/TRAINING PROGRAM

## 2022-06-14 PROCEDURE — 92002 INTRM OPH EXAM NEW PATIENT: CPT | Performed by: STUDENT IN AN ORGANIZED HEALTH CARE EDUCATION/TRAINING PROGRAM

## 2022-06-14 ASSESSMENT — REFRACTION_MANIFEST
OD_CYLINDER: -1.00
OD_SPHERE: -1.75
OS_AXIS: 170
OS_CYLINDER: -0.75
OU_VA: 20/20
OS_SPHERE: -1.50
OD_AXIS: 170
OD_VA1: 20/20
OS_VA1: 20/20

## 2022-06-14 ASSESSMENT — AXIALLENGTH_DERIVED
OD_AL: 24.6518
OS_AL: 24.4439
OS_AL: 24.4439
OD_AL: 24.7586

## 2022-06-14 ASSESSMENT — LID EXAM ASSESSMENTS
OD_MEIBOMITIS: RUL 1+
OS_MEIBOMITIS: LUL 1+

## 2022-06-14 ASSESSMENT — KERATOMETRY
OS_AXISANGLE_DEGREES: 083
OS_K1POWER_DIOPTERS: 42.50
OD_AXISANGLE_DEGREES: 090
OD_K2POWER_DIOPTERS: 43.75
OD_K1POWER_DIOPTERS: 42.50
OS_K2POWER_DIOPTERS: 44.00

## 2022-06-14 ASSESSMENT — REFRACTION_CURRENTRX
OS_AXIS: 169
OS_CYLINDER: -0.75
OD_AXIS: 016
OD_SPHERE: -2.00
OS_OVR_VA: 20/
OD_OVR_VA: 20/
OD_CYLINDER: -1.50
OS_SPHERE: -1.50

## 2022-06-14 ASSESSMENT — CONFRONTATIONAL VISUAL FIELD TEST (CVF)
OD_FINDINGS: FULL
OS_FINDINGS: FULL

## 2022-06-14 ASSESSMENT — REFRACTION_AUTOREFRACTION
OD_CYLINDER: -1.00
OS_CYLINDER: -0.75
OS_SPHERE: -1.50
OD_SPHERE: -2.00
OD_AXIS: 171
OS_AXIS: 168

## 2022-06-14 ASSESSMENT — TONOMETRY
OS_IOP_MMHG: 15
OD_IOP_MMHG: 14

## 2022-06-14 ASSESSMENT — SPHEQUIV_DERIVED
OD_SPHEQUIV: -2.25
OS_SPHEQUIV: -1.875
OD_SPHEQUIV: -2.5
OS_SPHEQUIV: -1.875

## 2022-06-14 ASSESSMENT — SUPERFICIAL PUNCTATE KERATITIS (SPK)
OD_SPK: 1+
OS_SPK: 1+

## 2022-06-14 ASSESSMENT — VISUAL ACUITY
OD_BCVA: 20/20
OS_BCVA: 20/25-1

## 2022-10-08 NOTE — ASSESSMENT
[Verbal] : Verbal [Written] : Written [Demo] : Demo [Patient] : Patient [Fair - mild discomfort, physical impairment, low acceptance] : Fair - mild discomfort, physical impairment, low acceptance [Verbalizes knowledge/Understanding] : Verbalizes knowledge/understanding [Dressing changes] : dressing changes [Skin Care] : skin care [Signs and symptoms of infection] : sign and symptoms of infection [How and When to Call] : how and when to call [Patient responsibility to plan of care] : patient responsibility to plan of care [] : Yes [Stable] : stable [Emergency Room] : Emergency Room [Not Applicable - Long Term Care/Home Health Agency] : Long Term Care/Home Health Agency: Not Applicable [FreeTextEntry2] : Promote optimal skin integrity, infection prevention, psychiatric evaluation\par  [FreeTextEntry4] : \par Patient reports that he became homeless yesterday and had suicide ideation yesterday.  Patient was seen by MD and taken directly to ER by RN and HBO tech for psychiatric evaluation and recommendation for social work assistance.\par  2 weeks

## 2024-01-14 NOTE — ED BEHAVIORAL HEALTH ASSESSMENT NOTE - NS ED [facility] ED COURSE PSYCHIATRIC MEDICATION GIVEN
Problem: At Risk for Falls  Goal: Patient takes action to control fall-related risks  Outcome: Not met, plan adjusted      None